# Patient Record
Sex: FEMALE | Race: OTHER | Employment: STUDENT | URBAN - METROPOLITAN AREA
[De-identification: names, ages, dates, MRNs, and addresses within clinical notes are randomized per-mention and may not be internally consistent; named-entity substitution may affect disease eponyms.]

---
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
## 2024-06-02 ENCOUNTER — HOSPITAL ENCOUNTER (OUTPATIENT)
Age: 24
Discharge: ANOTHER HOSPITAL | End: 2024-06-02
Payer: COMMERCIAL

## 2024-06-02 VITALS
RESPIRATION RATE: 18 BRPM | TEMPERATURE: 98 F | DIASTOLIC BLOOD PRESSURE: 67 MMHG | BODY MASS INDEX: 33.13 KG/M2 | SYSTOLIC BLOOD PRESSURE: 137 MMHG | HEART RATE: 89 BPM | WEIGHT: 180 LBS | OXYGEN SATURATION: 99 % | HEIGHT: 62 IN

## 2024-06-02 DIAGNOSIS — R10.9 ABDOMINAL PAIN, ACUTE: ICD-10-CM

## 2024-06-02 DIAGNOSIS — R55 SYNCOPE, NEAR: Primary | ICD-10-CM

## 2024-06-02 LAB — GLUCOSE BLDC GLUCOMTR-MCNC: 116 MG/DL (ref 70–99)

## 2024-06-02 RX ORDER — MIRTAZAPINE 30 MG/1
30 TABLET, FILM COATED ORAL DAILY
COMMUNITY
Start: 2024-05-27

## 2024-06-02 RX ORDER — CLONAZEPAM 0.5 MG/1
0.25 TABLET ORAL DAILY
COMMUNITY
Start: 2024-05-30

## 2024-06-02 RX ORDER — ESCITALOPRAM OXALATE 10 MG/1
10 TABLET ORAL DAILY
COMMUNITY
Start: 2024-04-20

## 2024-06-02 RX ORDER — ESCITALOPRAM OXALATE 5 MG/1
5 TABLET ORAL DAILY
COMMUNITY
Start: 2024-02-12

## 2024-06-02 NOTE — ED PROVIDER NOTES
Patient Seen in: [facility]      History     Chief Complaint   Patient presents with    Back Pain    Other     Presyncopal event while in shower, had dizziness, visual changes.      Stated Complaint: Hip/Back Pain  Subjective:   23-year-old female with a history of anxiety and depression presents for sudden onset of bilateral hip and abdominal pain that she woke up with this morning.  She states the pain is so bad that she has had 2 near syncopal episodes.  She states the pain radiates to the back.  No pain with ambulation.  No history of this pain in the past.  No injury or trauma.  No fevers.  She states she went to the shower this morning hoping the warm water would help her back and hips, but she had to lay down in the shower because she almost passed out.  Same thing happened when she arrived here.  She just completed her menses.  No vaginal complaints.  No concerns for STDs.  No urinary symptoms.  No history of kidney stone.  She appears uncomfortable.  She is here with family.      Objective:   Past Medical History:    Anxiety    Depression            History reviewed. No pertinent surgical history.           Social History     Socioeconomic History    Marital status: Single   Tobacco Use    Smoking status: Never    Smokeless tobacco: Never   Substance and Sexual Activity    Alcohol use: Never    Drug use: Never            Review of Systems    Positive for stated complaint: Hip/Back Pain  Other systems are as noted in HPI.  Constitutional and vital signs reviewed.      All other systems reviewed and negative except as noted above.    Physical Exam     ED Triage Vitals [06/02/24 1235]   /67   Pulse 89   Resp 18   Temp 97.9 °F (36.6 °C)   Temp src Temporal   SpO2 99 %   O2 Device None (Room air)     Current:/67   Pulse 89   Temp 97.9 °F (36.6 °C) (Temporal)   Resp 18   Ht 157.5 cm (5' 2\")   Wt 81.6 kg   LMP 05/28/2024   SpO2 99%   BMI 32.92 kg/m²     Physical Exam  Vitals and  nursing note reviewed.   Constitutional:       Appearance: Normal appearance. She is not toxic-appearing.   HENT:      Head: Normocephalic.      Nose: Nose normal.      Mouth/Throat:      Mouth: Mucous membranes are moist.   Eyes:      Pupils: Pupils are equal, round, and reactive to light.   Cardiovascular:      Rate and Rhythm: Normal rate and regular rhythm.   Pulmonary:      Effort: Pulmonary effort is normal.      Breath sounds: Normal breath sounds.   Abdominal:      General: Abdomen is flat. Bowel sounds are normal.      Palpations: Abdomen is soft.      Tenderness: There is no right CVA tenderness or left CVA tenderness.      Hernia: No hernia is present.       Musculoskeletal:      Cervical back: Normal range of motion and neck supple.      Right hip: Tenderness present.      Left hip: Tenderness present.      Comments: No point tenderness to the lumbar, thoracic, or cervical spines.  Pain from the hips and abdomen radiate around across the lower back.  She does have pain to the bilateral hips with movement.   Skin:     Capillary Refill: Capillary refill takes less than 2 seconds.      Coloration: Skin is pale.   Neurological:      General: No focal deficit present.      Mental Status: She is alert and oriented to person, place, and time.   Psychiatric:         Mood and Affect: Mood normal.         Behavior: Behavior normal.         ED Course   No results found.  Labs Reviewed   POCT GLUCOSE - Abnormal; Notable for the following components:       Result Value    POC Glucose  116 (*)     All other components within normal limits       MDM     Medical Decision Making  Prior to me arriving to the room, the patient had another near syncopal episode.  She states she got sudden onset of pain to the abdomen that radiates into the hips and back.  She states that she became diaphoretic, developed blurred vision, and became dizzy.  She states she had to lay on the bed.  Her Accu-Chek today is 116.  Based on these  frequent near syncopal episodes with acute abdominal pain, I feel she needs further workup in the emergency department.  She agrees with plan of care and her mother will drive her to the emergency department at [facility].    Amount and/or Complexity of Data Reviewed  Labs: ordered.     Details: Accu-Chek 116.  Discussion of management or test interpretation with external provider(s): I discussed this patient with Dr. Wong.  He agrees with plan of care.        Disposition and Plan     Clinical Impression:  1. Syncope, near    2. Abdominal pain, acute         Disposition:  Ic to ed  6/2/2024  1:13 pm    Follow-up:  No follow-up provider specified.        Medications Prescribed:  Discharge Medication List as of 6/2/2024  1:14 PM

## 2024-06-02 NOTE — ED INITIAL ASSESSMENT (HPI)
Pt reports bilateral hip pain and back pain upon waking today. Denies injury/trauma/falls. States she took a shower to help with pain and had near-syncopal event; laid down in shower. No current dizziness.

## 2025-06-19 ENCOUNTER — TELEPHONE (OUTPATIENT)
Dept: FAMILY MEDICINE CLINIC | Facility: CLINIC | Age: 25
End: 2025-06-19